# Patient Record
Sex: MALE | Race: WHITE | URBAN - METROPOLITAN AREA
[De-identification: names, ages, dates, MRNs, and addresses within clinical notes are randomized per-mention and may not be internally consistent; named-entity substitution may affect disease eponyms.]

---

## 2021-05-31 ENCOUNTER — OFFICE VISIT - RIVER FALLS (OUTPATIENT)
Dept: FAMILY MEDICINE | Facility: CLINIC | Age: 86
End: 2021-05-31

## 2021-06-01 ENCOUNTER — AMBULATORY - RIVER FALLS (OUTPATIENT)
Dept: FAMILY MEDICINE | Facility: CLINIC | Age: 86
End: 2021-06-01

## 2021-06-01 ENCOUNTER — COMMUNICATION - RIVER FALLS (OUTPATIENT)
Dept: FAMILY MEDICINE | Facility: CLINIC | Age: 86
End: 2021-06-01

## 2021-06-03 ENCOUNTER — AMBULATORY - RIVER FALLS (OUTPATIENT)
Dept: FAMILY MEDICINE | Facility: CLINIC | Age: 86
End: 2021-06-03

## 2021-06-04 ENCOUNTER — COMMUNICATION - RIVER FALLS (OUTPATIENT)
Dept: FAMILY MEDICINE | Facility: CLINIC | Age: 86
End: 2021-06-04

## 2021-06-07 ENCOUNTER — AMBULATORY - RIVER FALLS (OUTPATIENT)
Dept: FAMILY MEDICINE | Facility: CLINIC | Age: 86
End: 2021-06-07

## 2021-06-08 ENCOUNTER — COMMUNICATION - RIVER FALLS (OUTPATIENT)
Dept: FAMILY MEDICINE | Facility: CLINIC | Age: 86
End: 2021-06-08

## 2021-06-14 ENCOUNTER — AMBULATORY - RIVER FALLS (OUTPATIENT)
Dept: FAMILY MEDICINE | Facility: CLINIC | Age: 86
End: 2021-06-14

## 2021-06-14 LAB — INR PPP: 2.1

## 2021-06-15 LAB — INR PPP: 3.4

## 2021-06-16 ENCOUNTER — OFFICE VISIT - RIVER FALLS (OUTPATIENT)
Dept: FAMILY MEDICINE | Facility: CLINIC | Age: 86
End: 2021-06-16

## 2021-06-17 ENCOUNTER — AMBULATORY - RIVER FALLS (OUTPATIENT)
Dept: FAMILY MEDICINE | Facility: CLINIC | Age: 86
End: 2021-06-17

## 2021-06-23 ENCOUNTER — AMBULATORY - RIVER FALLS (OUTPATIENT)
Dept: FAMILY MEDICINE | Facility: CLINIC | Age: 86
End: 2021-06-23

## 2021-06-23 LAB — INR PPP: 3.6

## 2021-06-24 ENCOUNTER — COMMUNICATION - RIVER FALLS (OUTPATIENT)
Dept: FAMILY MEDICINE | Facility: CLINIC | Age: 86
End: 2021-06-24

## 2021-06-25 ENCOUNTER — OFFICE VISIT - RIVER FALLS (OUTPATIENT)
Dept: FAMILY MEDICINE | Facility: CLINIC | Age: 86
End: 2021-06-25

## 2021-07-05 LAB
BUN SERPL-MCNC: 37 MG/DL
CALCIUM SERPL-MCNC: 10.4 MEQ/DL
CHLORIDE BLD-SCNC: 102 MEQ/L
CO2 SERPL-SCNC: 21 MEQ/L
CREAT SERPL-MCNC: 1.59 MG/DL
HCT VFR BLD AUTO: 42.3 %
HGB BLD-MCNC: 14.1 G/DL
INR PPP: 2.7
MCH RBC QN AUTO: 30.5 PG
MCHC RBC AUTO-ENTMCNC: 33.3 GM/DL
MCV RBC AUTO: 91.4 FL
PLATELET # BLD AUTO: 237 X10
POTASSIUM BLD-SCNC: 4.6 MEQ/L
PROTHROMBIN TIME: 26.2 S
RBC # BLD AUTO: 4.63 X10
SODIUM SERPL-SCNC: 138 MEQ/L
WBC # BLD AUTO: 6.2 X10

## 2021-07-07 ENCOUNTER — COMMUNICATION - RIVER FALLS (OUTPATIENT)
Dept: FAMILY MEDICINE | Facility: CLINIC | Age: 86
End: 2021-07-07

## 2022-02-16 NOTE — TELEPHONE ENCOUNTER
---------------------  From: Joselin Manley RN   Sent: 7/21/2021 3:55:02 PM CDT  Subject: INR reminder     Patient is 21 days past due for INR.  First letter has been printed from [employer] and sent to [facility] for scanning and mailing.

## 2022-02-16 NOTE — NURSING NOTE
Anticoagulation Therapy Management Entered On:  6/15/2021 8:12 AM CDT    Performed On:  6/14/2021 8:12 AM CDT by Pam Mujica RN               Anticoagulation Visit Assessment   Type of Visit - Anticoagulation :   Telephone   Anticoagulation Indication :   Atrial flutter   Anticoagulant Duration :   Undetermined   Anticoagulation Medication Verified :   Yes   Pam Mujica RN - 6/15/2021 8:12 AM CDT   Anticoagulation Patient Assessment Grid   Change in Alcohol Consumption :   No   Change in Diet :   No   Change in Medications :   No   Diarrhea :   No   Rectal Bleeding :   No   Signs of Clotting :   No   Signs of Warfarin Intolerance :   No   Unusual Bleeding, Bruising :   No   Upcoming Procedures :   No   Vomiting :   No   Pam Mujica RN - 6/15/2021 8:12 AM CDT   Patient on Warfarin :   Yes   Pam Mujica RN - 6/15/2021 8:12 AM CDT   Warfarin   International Normalization Ratio TR :   3.4    Anticoagulant INR Goal Lower :   2    Anticoagulant INR Goal Upper :   3    Sunday :   2.5 mg   Monday :   5 mg   Tuesday :   2.5 mg   Wednesday :   5 mg   Thursday :   2.5 mg   Friday :   5 mg   Saturday :   2.5 mg   Total Dose :   25 mg   Warfarin Pt Reported Previous Week Dose :    Sun Mon Tues Wed Thurs Fri Sat Weekly Total Dose   Week 1 2.5 mg 5 mg 2.5 mg 5 mg 2.5 mg 5 mg 2.5 mg 25 mg   Week 2  mg  mg  mg  mg  mg  mg  mg  mg   Week 3  mg  mg  mg  mg  mg  mg  mg  mg   Week 4  mg  mg  mg  mg  mg  mg  mg  mg         Sunday :   2.5 mg   Monday :   5 mg   Tuesday :   0 mg   Wednesday :   5 mg   Thursday :   2.5 mg   Friday :   5 mg   Saturday :   2.5 mg   Week 1 Total Dose :   22.5 mg   Warfarin Dosing Acknowledgement :   I have reviewed the patient's warfarin dosing schedule and confirmed its accuracy for today's visit.   Patient Instructions :   INR = 3.4 per [facility] lab. Per protocol, hold Warfarin x1 dose then resume 5mg Mon/Wed/Fri and 2.5mg ROW. Recheck INR 6/17/21. Orders faxed.      Pam Mujica RN - 6/15/2021 8:12 AM CDT

## 2022-02-16 NOTE — PROGRESS NOTES
History of Present Illness       New admit to [facility] facility visit.  16th patient has had significant health issues that include heart failure COPD coronary artery disease obstructive sleep apnea hypertension anemia gout and atrial fibrillation.  He underwent right total hip arthroplasty because of the pain he was having there.  He is in the nursing home now for physical therapy.  Upon admission he had significant amount of fluid retention his Lasix was increased from 20 to 40 mg now has had a significant weight loss and the correct patient probably at his near driveway.       His ejection fracture less than 35% he has not been very active.       He still using Dilaudid for occasional pains about twice a day was written for by orthopedics       His present plan is for return to his home in about 1 week time.  He is making arrangements and helped through .       Last blood test were from May 28 that time he had a creatinine of 1.2 and a potassium of 5.8 his hemoglobin was 11.8       Patient feels like his progress is going well been eating and sleeping well he has no complaints about breathing or activity  Review of Systems       See HPI.  All other review of systems negative.  Physical Exam       Patient is lying in his bed very talkative without any respiratory distress he is laying flat.  He has 2+ edema lower extremities bilaterally equal his abdomen is nondistended       No swelling in his ankles or knees  Assessment/Plan       1. Congestive heart failure (I50.9)          Will be doing a BNP along with a Chem-8 appears he may be at dry weight will decrease his furosemide to 20 mg which would be his most common stable dose over the last several months       2. Chronic obstructive pulmonary disease, unspecified (J44.9)          No signs of respiratory distress no coughing       3. 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 CAD (coronary artery disease) (I25.10)          He has not had any chest pains or pressure he is tolerating his activity level at a nursing home       5. Obstructive sleep apnea (adult) (pediatric) (G47.33)          He should be treating his BARRETT during this time of healing especially  Patient Information     Name:CARLEY LATHAM      Address:      [address] [id number]     Sex:Male     YOB: 1935     [phone]     Emergency Contact:MARGO LOWRY     MRN:[id number]     FIN:[id number]     Location:[facility]     Date of Service:06/16/2021      Primary Care Physician:       Shanique De Anda MD, [phone]      Attending Physician:       Guillaume Smith MD, [phone]  Problem List/Past Medical History    Ongoing     Abdominal aortic ectasia     Anemia, unspecified     Anticoagulated     Benign prostatic hyperplasia without lower urinary tract symptoms     CAD (coronary artery disease)     Chronic obstructive pulmonary disease, unspecified     Congestive heart failure     Essential (primary) hypertension     GERD (gastroesophageal reflux disease)     Gout     Hyperkalemia     Hyperlipidemia, unspecified     Ischemic cardiomyopathy     Obstructive sleep apnea (adult) (pediatric)     Pain in right hip     Radiculopathy, site unspecified     Spinal stenosis, lumbar region with neurogenic claudication     Thrombocytopenia, unspecified     Unspecified atrial fibrillation    Historical     No qualifying data  Procedure/Surgical History     Arthroplasty of the hip (05/24/2021)      Comments: Total right..     Cardiac catheterization (08/24/2020)     Decompression of lumbar spine (12/10/2018)     Pacemaker care (12/01/2016)      Comments: Pack change..     Arthroscopy of knee (12/15/2014)      Comments: Right..     Cardiac pacemaker (08/06/2013)     Hemorrhoidectomy (1999)  Medications    acetaminophen, 1000 mg, Oral, q6 hrs, PRN     allopurinol 100 mg oral tablet, 100 mg= 1 tab(s), Oral, bid    budesonide 0.5 mg/2 mL inhalation suspension, 0.5 mg= 2 mL, NEB, daily    carvedilol 12.5 mg oral tablet, 12.5 mg= 1 tab(s), Oral, daily    Cepacol Sore Throat 15 mg-3.6 mg mucous membrane lozenge, 1 lozenge(s), Oral, q2 hr (int), PRN    cholecalciferol 1000 intl units oral tablet, 1000 International Unit= 1 tab(s), Oral, bid    colchicine 0.6 mg oral tablet, 0.3 mg= 0.5 tab(s), Oral, tid, PRN    Dilaudid 2 mg oral tablet, 0.5 tab, Oral, q3 hr (int), PRN    DuoNeb 0.5 mg-2.5 mg/3 mL inhalation solution, 3 mL, Inhale, qid    enoxaparin 40 mg/0.4 mL injectable solution, 40 mg, Subcutaneous, daily    hydrocortisone 1% topical cream, Topical, qid    lidocaine 5% patch, 1 patch(es), Topical, daily    losartan 50 mg oral tablet, 50 mg= 1 tab(s), Oral, daily    lovastatin 40 mg oral tablet, 40 mg= 1 tab(s), Oral, daily    polyethylene glycol 3350 with electrolytes oral powder for reconstitution, PRN    Senokot S 50 mg-8.6 mg oral tablet, 1 tab(s), Oral, hs, PRN    spironolactone 25 mg oral tablet, 25 mg= 1 tab(s), Oral, daily    Tums, 500 mg, Chewed, daily    Voltaren 1% topical gel, 1 leena, Topical, qid, PRN    warfarin 5 mg oral tablet, 5 mg= 1 tab(s), Oral, daily  Allergies    NSAIDs (Flushing)    Ticlid    ceFAZolin    simvastatin  Social History     Employment/School      Retired, Work/School description: .     Home/Environment      Marital status: . Spouse/Partner name: Gaby Guy. Home equipment: Walker/Cane.  Lab Results          Lab Results (Last 4 results within 90 days)           PT: 15.9 High [9  - 11.5] (06/01/21 00:00:00)          INR: 1.7 High (06/01/21 00:00:00)          INR TR: 3.4 (06/14/21 08:12:00)          INR TR: 2.1 (06/08/21 14:43:00)  Immunizations          Other Immunizations          Administration Dosage Date(s)          tetanus/diphth/pertuss (Tdap) adult/adol          09/09/2020

## 2022-02-16 NOTE — TELEPHONE ENCOUNTER
---------------------  From: Kadi Harmon CMA (Phone Messages Pool ([id number]_[facility]))   To: Ivan Herrera PA-C;     Sent: 6/4/2021 9:03:22 AM CDT  Subject: Phone Message, medications.     Phone Message    PCP:   KAH      Time of Call:  0854       Person Calling:  Zuleika with [facility].   Phone number:  [phone]    Returned call at: Direct call.    Note:   MARY ANNE had given orders to hold BP medications per Zuleika until today. She wonders, if they should restart the BP medications now? She requests a call from KAH to discuss medications. Please advise.    Last office visit and reason:  05-31-21 routine nursing home rounds CHT---------------------  From: Ivan Herrera PA-C   To: Phone Messages [employer] ([id number]_[place]);     Sent: 6/4/2021 9:09:29 AM CDT  Subject: RE: Phone Message, medications.     I did call her. Weight up about 5 days. Resume 40 mg po daily of Lasix. Hold on rest of medications    KAHNoted

## 2022-02-16 NOTE — TELEPHONE ENCOUNTER
---------------------  From: Pam Mujica RN   Sent: 6/1/2021 4:29:48 PM CDT  Subject: INR Results     Spoke with Libia at [facility] and informed her that no INR results are back yet. Advised they continue 2.5mg Warfarin tonight and we will fax updated orders in the morning. She expressed understanding.

## 2022-02-16 NOTE — TELEPHONE ENCOUNTER
---------------------  From: Pam Mujica RN   Sent: 6/7/2021 4:42:21 PM CDT  Subject: INR     Spoke with Zuleika at [facility] - informed her no INR results yet for patient INR drawn today. Advised they continue with same dose tonight - orders will be faxed back tomorrow morning. She expressed understanding.

## 2022-02-16 NOTE — TELEPHONE ENCOUNTER
---------------------  From: Pam Mujica RN (INR Pool ( [id number]_[facility]))   To: Shanique De Anda MD;     Sent: 6/8/2021 8:00:22 AM CDT  Subject: Lovenox     Pt currently taking Warfarin 5mg Mon/Wed/Fri and 2.5mg ROW - along with Lovenox 40mg daily.    6/7/21 INR = 2.1    OK to discontinue Lovenox?---------------------  From: Pam Mujica RN (INR Pool ( [id number]_[facility]))   To: Ivan Herrera PA-C;     Sent: 6/8/2021 2:03:55 PM CDT  Subject: FW: Lovenox     OK for patient to discontinue Lovenox?  (Forgot KWL not in clinic today)---------------------  From: Ivan Herrera PA-C   To: INR Pool ( [id number]_[facility]);     Sent: 6/8/2021 2:18:22 PM CDT  Subject: RE: Lovenox     Yes, OK to discontinue the Lovenox    KAHCalled and spoke with Zuleika - advised she discontinue Lovenox per KAH. She expressed understanding.

## 2022-02-16 NOTE — NURSING NOTE
Anticoagulation Therapy Management Entered On:  6/23/2021 5:03 PM CDT    Performed On:  6/23/2021 4:59 PM CDT by Joselin Manley RN               Anticoagulation Visit Assessment   Anticoagulation Indication :   Atrial flutter   Anticoagulant Duration :   Undetermined   Anticoagulation Medication Verified :   Yes   Joselin Manley RN - 6/23/2021 4:59 PM CDT   Anticoagulation Patient Assessment Grid   Change in Alcohol Consumption :   No   Change in Diet :   No   Change in Medications :   No   Diarrhea :   No   Rectal Bleeding :   No   Signs of Clotting :   No   Signs of Warfarin Intolerance :   No   Unusual Bleeding, Bruising :   No   Upcoming Procedures :   No   Vomiting :   No   Joselin Manley RN - 6/23/2021 4:59 PM CDT   Patient on Warfarin :   Yes   Joselin Manley RN - 6/23/2021 4:59 PM CDT   Warfarin   International Normalization Ratio TR :   3.6    Anticoagulant INR Goal Lower :   2    Anticoagulant INR Goal Upper :   3    Sunday :   2.5 mg   Monday :   5 mg   Tuesday :   2.5 mg   Wednesday :   0 mg   Thursday :   2.5 mg   Friday :   2.5 mg   Saturday :   2.5 mg   Total Dose :   17.5 mg   Warfarin Pt Reported Previous Week Dose :    Sun Mon Tues Wed Thurs Fri Sat Weekly Total Dose   Week 1 2.5 mg 5 mg 0 mg 5 mg 2.5 mg 5 mg 2.5 mg 22.5 mg   Week 2  mg  mg  mg  mg  mg  mg  mg  mg   Week 3  mg  mg  mg  mg  mg  mg  mg  mg   Week 4  mg  mg  mg  mg  mg  mg  mg  mg         Sunday :   2.5 mg   Monday :   5 mg   Tuesday :   0 mg   Wednesday :   5 mg   Thursday :   2.5 mg   Friday :   5 mg   Saturday :   2.5 mg   Week 1 Total Dose :   22.5 mg   Warfarin Dosing Acknowledgement :   I have reviewed the patient's warfarin dosing schedule and confirmed its accuracy for today's visit.   Patient Instructions :   [facility] INR = 3.6; per protocol hold warfarin x1 day then resume at 5 mg Monday and 2.5 mg ROW. Recheck INR in 1 week. Faxed and called directions to [facility].     Joselin Manley RN - 6/23/2021 4:59 PM CDT

## 2022-02-16 NOTE — NURSING NOTE
Anticoagulation Therapy Management Entered On:  6/18/2021 11:03 AM CDT    Performed On:  6/18/2021 10:59 AM CDT by Felicita Thomas RN               Anticoagulation Visit Assessment   Anticoagulation Indication :   Atrial flutter   Anticoagulant Duration :   Undetermined   Anticoagulation Medication Verified :   Yes   Felicita Thomas RN - 6/18/2021 10:59 AM CDT   Anticoagulation Patient Assessment Grid   Change in Alcohol Consumption :   No   Change in Diet :   No   Change in Medications :   No   Diarrhea :   No   Rectal Bleeding :   No   Signs of Clotting :   No   Signs of Warfarin Intolerance :   No   Unusual Bleeding, Bruising :   No   Upcoming Procedures :   No   Vomiting :   No   Felicita Thomas RN - 6/18/2021 10:59 AM CDT   Patient on Warfarin :   Yes   Felicita Thomas RN - 6/18/2021 10:59 AM CDT   Warfarin   Anticoagulant INR Goal Lower :   2    Anticoagulant INR Goal Upper :   3    Sunday :   2.5 mg   Monday :   5 mg   Tuesday :   2.5 mg   Wednesday :   5 mg   Thursday :   2.5 mg   Friday :   5 mg   Saturday :   2.5 mg   Total Dose :   25 mg   Warfarin Pt Reported Previous Week Dose :    Sun Mon Tues Wed Thurs Fri Sat Weekly Total Dose   Week 1 2.5 mg 5 mg 0 mg 5 mg 2.5 mg 5 mg 2.5 mg 22.5 mg   Week 2  mg  mg  mg  mg  mg  mg  mg  mg   Week 3  mg  mg  mg  mg  mg  mg  mg  mg   Week 4  mg  mg  mg  mg  mg  mg  mg  mg         Sunday :   2.5 mg   Monday :   5 mg   Tuesday :   0 mg   Wednesday :   5 mg   Thursday :   2.5 mg   Friday :   5 mg   Saturday :   2.5 mg   Week 1 Total Dose :   22.5 mg   Warfarin Dosing Acknowledgement :   I have reviewed the patient's warfarin dosing schedule and confirmed its accuracy for today's visit.   Patient Instructions :   INR = 2.7 per [facility] draw on 6/17/21 Patient is to continue 5mg warfarin on Mon, Wed, and Fri and 2.5mg the rest of the days of the week Recheck INR on 6/23/21. Directions faxed to [facility]. Patient plans to be discharged to home on 6/25/21     William GOODSON  Felicita - 6/18/2021 10:59 AM CDT

## 2022-02-16 NOTE — TELEPHONE ENCOUNTER
---------------------  From: Pam Mujica RN (Phone Messages Pool ([id number]_[facility]))   To: INR Pool ( [id number]_[facility]);     Sent: 6/24/2021 10:09:51 AM CDT  Subject: KELSIE Reza at [facility] patient is discharging home tomorrow.noted   INR due  6/30/21   28-Aug-2018

## 2022-02-16 NOTE — NURSING NOTE
Anticoagulation Therapy Management Entered On:  6/14/2021 2:44 PM CDT    Performed On:  6/8/2021 2:43 PM CDT by Pam Mujica RN               Anticoagulation Visit Assessment   Type of Visit - Anticoagulation :   Telephone   Anticoagulation Indication :   Atrial flutter   Anticoagulant Duration :   Undetermined   Anticoagulation Medication Verified :   Yes   Pam Mujica RN - 6/14/2021 2:43 PM CDT   Anticoagulation Patient Assessment Grid   Change in Alcohol Consumption :   No   Change in Diet :   No   Change in Medications :   No   Diarrhea :   No   Rectal Bleeding :   No   Signs of Clotting :   No   Signs of Warfarin Intolerance :   No   Unusual Bleeding, Bruising :   No   Upcoming Procedures :   No   Vomiting :   No   Pam Mujica RN - 6/14/2021 2:43 PM CDT   Patient on Warfarin :   Yes   Pam Mujica RN - 6/14/2021 2:43 PM CDT   Warfarin   International Normalization Ratio TR :   2.1    Anticoagulant INR Goal Lower :   2    Anticoagulant INR Goal Upper :   3    Sunday :   2.5 mg   Monday :   5 mg   Tuesday :   2.5 mg   Wednesday :   5 mg   Thursday :   2.5 mg   Friday :   5 mg   Saturday :   2.5 mg   Total Dose :   25 mg   Warfarin Pt Reported Previous Week Dose :    Sun Mon Tues Wed Thurs Fri Sat Weekly Total Dose   Week 1 2.5 mg 5 mg 2.5 mg 5 mg 2.5 mg 5 mg 2.5 mg 25 mg   Week 2  mg  mg  mg  mg  mg  mg  mg  mg   Week 3  mg  mg  mg  mg  mg  mg  mg  mg   Week 4  mg  mg  mg  mg  mg  mg  mg  mg         Sunday :   2.5 mg   Monday :   5 mg   Tuesday :   2.5 mg   Wednesday :   5 mg   Thursday :   2.5 mg   Friday :   5 mg   Saturday :   2.5 mg   Week 1 Total Dose :   25 mg   Warfarin Dosing Acknowledgement :   I have reviewed the patient's warfarin dosing schedule and confirmed its accuracy for today's visit.   Patient Instructions :   INR = 2.1 per [facility] lab. Per protocol, continue Warfarin 5mg Mon/Wed/Fri and 2.5mg ROW. Recheck INR 6/14/21. Orders faxed.      Pam Mujica RN - 6/14/2021 2:43 PM CDT

## 2022-02-16 NOTE — NURSING NOTE
Anticoagulation Therapy Management Entered On:  6/4/2021 9:44 AM CDT    Performed On:  6/4/2021 9:39 AM CDT by Felicita Thomas RN               Anticoagulation Visit Assessment   Anticoagulation Indication :   Atrial flutter   Anticoagulant Duration :   Undetermined   Anticoagulation Medication Verified :   Yes   Felicita Thomas RN - 6/4/2021 9:39 AM CDT   Anticoagulation Patient Assessment Grid   Change in Alcohol Consumption :   No   Change in Diet :   No   Change in Medications :   Yes   (Comment: Still on lovenox [Felicita Thomas RN - 6/4/2021 9:39 AM CDT] )   Diarrhea :   No   Rectal Bleeding :   No   Signs of Clotting :   No   Signs of Warfarin Intolerance :   No   Unusual Bleeding, Bruising :   No   Upcoming Procedures :   No   Vomiting :   No   Felicita Thomas RN - 6/4/2021 9:39 AM CDT   Patient on Warfarin :   Yes   Felicita Thomas RN - 6/4/2021 9:39 AM CDT   Warfarin   Anticoagulant INR Goal Lower :   2    Anticoagulant INR Goal Upper :   3    Information Given by :   Other: [facility]   Sunday :   5 mg   Monday :   2.5 mg   Tuesday :   2.5 mg   Wednesday :   5 mg   Thursday :   2.5 mg   Total Dose :   17.5 mg   Warfarin Pt Reported Previous Week Dose :    Sun Mon Tues Wed Thurs Fri Sat Weekly Total Dose   Week 1 5 mg 2.5 mg 2.5 mg 5 mg 0 mg 0 mg 0 mg 15 mg   Week 2  mg  mg  mg  mg  mg  mg  mg  mg   Week 3  mg  mg  mg  mg  mg  mg  mg  mg   Week 4  mg  mg  mg  mg  mg  mg  mg  mg         Sunday :   2.5 mg   Monday :   5 mg   Tuesday :   2.5 mg   Wednesday :   5 mg   Thursday :   2.5 mg   Friday :   5 mg   Saturday :   2.5 mg   Week 1 Total Dose :   25 mg   Warfarin Dosing Acknowledgement :   I have reviewed the patient's warfarin dosing schedule and confirmed its accuracy for today's visit.   Patient Instructions :   INR = 1.8 per [facility] draw on 6/3/21 Patient is to start 5mg warfarin on Mon , Wed, Fri and 2.5mg the rest of the days of the week Recheck INR on 6/7/21 Continue Lovenox. Directions faxed to  [facility].     William GOODSON, Felicita - 6/4/2021 9:39 AM CDT

## 2022-02-16 NOTE — TELEPHONE ENCOUNTER
---------------------  From: Joselin Manley RN   Sent: 8/4/2021 11:50:43 AM CDT  Subject: Warfarin dosing by DAYNA Lenz     Called pt as he is 35 days past due for INR  He is having INRs done and warfarin dosed by DAYNA Lenz  [facility]

## 2022-02-16 NOTE — TELEPHONE ENCOUNTER
---------------------  From: Felicita Thomas RN   To: INR Pool ( [id number]_[facility]);     Sent: 6/3/2021 4:47:44 PM CDT  Subject: INR     Call to [facility] Advised nurse we do not have the INR back from today yet. Patient to take 2.5mg warfarin today 6/3/21 and hold Lovenox until noon on 6/4/21 as he may not need further Lovenox.  Will contact them with results on 6/4/21. [facility] Nurse advised.faxed directions

## 2022-02-16 NOTE — NURSING NOTE
Anticoagulation Therapy Management Entered On:  6/2/2021 11:49 AM CDT    Performed On:  6/2/2021 11:38 AM CDT by Felicita Thomas RN               Anticoagulation Visit Assessment   Anticoagulation Indication :   Atrial flutter   Anticoagulant Duration :   Undetermined   Anticoagulation Medication Verified :   Yes   Felicita Thomas RN - 6/2/2021 11:38 AM CDT   Anticoagulation Patient Assessment Grid   Change in Alcohol Consumption :   No   Change in Diet :   No   Change in Medications :   Yes   (Comment: Enoxaparin 40 mg daily  status post hip replacement [Felicita Thomas RN - 6/2/2021 11:38 AM CDT] )   Diarrhea :   No   Rectal Bleeding :   No   Signs of Clotting :   No   Signs of Warfarin Intolerance :   No   Unusual Bleeding, Bruising :   No   Upcoming Procedures :   No   Vomiting :   No   Felicita Thomas RN - 6/2/2021 11:38 AM CDT   Patient on Warfarin :   Yes   Patient on Other Anticoagulant :   No   Felicita Thomas RN - 6/2/2021 11:38 AM CDT   Warfarin   Anticoagulant INR Goal Lower :   2    Anticoagulant INR Goal Upper :   3    Information Given by :   Other: [facility]   Bridge Therapy Indication :   Major orthopedic surgery   Sunday :   5 mg   Monday :   2.5 mg   Tuesday :   2.5 mg   Wednesday :   0 mg   Thursday :   0 mg   Friday :   0 mg   Saturday :   0 mg   Total Dose :   10 mg   Warfarin Pt Reported Previous Week Dose :    Sun Mon Tues Wed Thurs Fri Sat Weekly Total Dose   Week 1                   Week 2                   Week 3                   Week 4                         Sunday :   5 mg   Monday :   2.5 mg   Tuesday :   2.5 mg   Wednesday :   5 mg   Thursday :   0 mg   Friday :   0 mg   Saturday :   0 mg   Week 1 Total Dose :   15 mg   Warfarin Dosing Acknowledgement :   I have reviewed the patient's warfarin dosing schedule and confirmed its accuracy for today's visit.   Patient Instructions :   INR = 1.7 per [facility] drawn on 6/1/21 Patient is to take 5mg warfarin today and recheck INR on 6/3/21.  Directions faxed to [facility]. Will continue Enoxaparin today. Directions faxed to [facility].     Felicita Thomas RN - 6/2/2021 11:38 AM CDT   Bridge Therapy   Bridging Medication :   enoxaparin   Bridging Therapy Comments :   40mg daily    Felicita Thomas RN - 6/2/2021 11:38 AM CDT

## 2022-02-16 NOTE — TELEPHONE ENCOUNTER
---------------------  From: Pam Mujica RN (Phone Messages Pool ([id number]_[facility]))   To: [facility] Message Pool ([id number]_[place]);     Sent: 6/24/2021 10:01:46 AM CDT  Subject: Phone Message     Phone Message    PCP:   KWL - saw ZIM during rounds      Time of Call:  0959       Person Calling:  [facility]  Phone number:  [phone]    Returned call at: _    Note:   Received direct call from Libia. She states patient is preparing for discharge home tomorrow - discharge orders signed by ANH yesterday. His pharmacy is requesting individual prescriptions be sent to them vs a medication list. Libia states she will fax over current med list to [facility] team if he will send prescriptions to [facility] in [place].---------------------  From: Sahara Garzon CMA ([facility] Message Pool ([id number]_[facility]))   To: Guillaume Smith MD;     Sent: 6/24/2021 10:10:40 AM CDT  Subject: FW: Phone Message---------------------  From: Guillaume Smith MD   To: [facility] Message Pool ([id number]_[place]);     Sent: 6/24/2021 11:41:16 AM CDT  Subject: RE: Phone Message     ok to send medsI sent these in to [facility] in [place] per notes from the fax.

## 2022-02-16 NOTE — TELEPHONE ENCOUNTER
---------------------  From: Heather Burkett CMA (Phone Messages Pool ([id number]_[employer]))   To: Guillaume Smith MD;     Sent: 7/12/2021 2:39:40 PM CDT  Subject: phone note- Low BP     Pedro Maher  # [phone]    Note: Calling with Low BP, 82/49  P83 and 02%95. It is their protocol to update provider with low BP. If you would like them to do anything call [place].---------------------  From: Guillaume Smith MD   To: Phone Messages Pool ([id number]_[place] [facility] [place]);     Sent: 7/12/2021 2:44:00 PM CDT  Subject: RE: phone note- Low BP     i would hold losartan until blood pressure greater than 140 systolic.Notified patient at 1456 to hold Losartan. Call with an update in one month with BP readings. Sees Dr Rosario cardiologist 07-26-21 per patient.---------------------  From: Kadi Harmon CMA (Phone Messages Pool ([id number]_[employer]))   To: Guillaume Smith MD;     Sent: 7/12/2021 2:57:23 PM CDT  Subject: FW: phone note- Low BP

## 2022-02-16 NOTE — PROGRESS NOTES
History of Present Illness      Patient is ready to go home. Physical therapy thinks he will do well and he does too. He is up walking in the [place] with his walker and getting along well.  Review of Systems      Otherwise not contributory  Physical Exam      VSS afebrile NAD      He has minimal hip pain and his ambulating with Walker without difficulty.  Assessment/Plan       History of total hip arthroplasty (Z96.649)         Will discharge home with home PT and his usual orders. He will follow up with his PA from [facility] in [place].  Patient Information     Name:CARLEY LATHAM      Address:       [address] [id number]     Sex:Male     YOB: 1935     [phone]     Emergency Contact:MARGO LOWRY     MRN:[id number]     FIN:[id number]     Location:[facility]     Date of Service:06/25/2021      Primary Care Physician:       Neetu ROU[facility], [facility], [phone]      Attending Physician:       Merle ROUSE, [place], [phone]  Problem List/Past Medical History    Ongoing     Abdominal aortic ectasia     Anemia, unspecified     Anticoagulated     Benign prostatic hyperplasia without lower urinary tract symptoms     CAD (coronary artery disease)     Chronic obstructive pulmonary disease, unspecified     Congestive heart failure     Essential (primary) hypertension     GERD (gastroesophageal reflux disease)     Gout     Hyperkalemia     Hyperlipidemia, unspecified     Ischemic cardiomyopathy     Obstructive sleep apnea (adult) (pediatric)     Pain in right hip     Radiculopathy, site unspecified     Spinal stenosis, lumbar region with neurogenic claudication     Thrombocytopenia, unspecified     Unspecified atrial fibrillation    Historical     No qualifying data  Procedure/Surgical History     Arthroplasty of the hip (05/24/2021)      Comments: Total right..     Cardiac catheterization (08/24/2020)     Decompression of lumbar spine  (12/10/2018)     Pacemaker care (12/01/2016)      Comments: Pack change..     Arthroscopy of knee (12/15/2014)      Comments: Right..     Cardiac pacemaker (08/06/2013)     Hemorrhoidectomy (1999)  Medications    acetaminophen, 1000 mg, Oral, q6 hrs, PRN    allopurinol 100 mg oral tablet, 100 mg= 1 tab(s), Oral, bid, 2 refills    budesonide 0.5 mg/2 mL inhalation suspension, 0.5 mg= 2 mL, NEB, daily, 2 refills    carvedilol 12.5 mg oral tablet, 12.5 mg= 1 tab(s), Oral, daily    Cepacol Sore Throat 15 mg-3.6 mg mucous membrane lozenge, 1 lozenge(s), Oral, q2 hr (int), PRN    cholecalciferol 1000 intl units oral tablet, 1000 International Unit= 1 tab(s), Oral, bid, 2 refills    colchicine 0.6 mg oral tablet, 0.3 mg= 0.5 tab(s), Oral, tid, PRN    Coumadin 2.5 mg oral tablet, See Instructions    Dilaudid 2 mg oral tablet, 0.5 tab, Oral, q3 hr (int), PRN    DuoNeb 0.5 mg-2.5 mg/3 mL inhalation solution, 3 mL, Inhale, qid, 2 refills    enoxaparin 40 mg/0.4 mL injectable solution, 40 mg, Subcutaneous, daily    furosemide 20 mg oral tablet, 20 mg= 1 tab(s), Oral, daily, 3 refills    hydrocortisone 1% topical cream, Topical, qid    lidocaine 5% topical film, 1 patch(es), Topical, daily, 3 refills    losartan 50 mg oral tablet, 50 mg= 1 tab(s), Oral, daily, 2 refills    lovastatin 40 mg oral tablet, 40 mg= 1 tab(s), Oral, daily    polyethylene glycol 3350 with electrolytes oral powder for reconstitution, PRN    Senokot S 50 mg-8.6 mg oral tablet, 1 tab(s), Oral, hs, PRN    spironolactone 25 mg oral tablet, 25 mg= 1 tab(s), Oral, daily, 2 refills    Tums, 500 mg, Chewed, daily    Voltaren 1% topical gel, 1 leena, Topical, qid, PRN    warfarin 5 mg oral tablet, See Instructions  Allergies    NSAIDs (Flushing)    Ticlid    ceFAZolin    simvastatin  Social History     Employment/School      Retired, Work/School description: .     Home/Environment      Marital status: . Spouse/Partner name: Gaby Guy. Home  equipment: Walker/Cane.  Lab Results          Lab Results (Last 4 results within 90 days)           PT: 33.5 High [9  - 11.5] (06/23/21 08:45:00)          PT: 15.9 High [9  - 11.5] (06/01/21 00:00:00)          INR: 3.6 High (06/23/21 08:45:00)          INR: 1.7 High (06/01/21 00:00:00)          INR TR: 3.6 (06/23/21 16:59:00)          INR TR: 3.4 (06/14/21 08:12:00)          INR TR: 2.1 (06/08/21 14:43:00)  Immunizations          Other Immunizations          Administration Dosage Date(s)          tetanus/diphth/pertuss (Tdap) adult/adol          09/09/2020

## 2022-02-16 NOTE — TELEPHONE ENCOUNTER
---------------------  From: Vineet RN, Joselin ALVARADO   To: INR Pool ( [id number]_[facility]);     Cc: Phone Messages Pool ([id number]_[place]);      Sent: 6/23/2021 3:45:53 PM CDT  Subject: NH call     Nurse looking for results on pt INR from today  No results available yet    If no results or warfarin directions by 5pm, call [phone] to inform staff to continue same warfarin dose today and await further instructions tomorrow.Call to [facility] at [phone]  We have no INR result from today  I informed nurse, Mana.   Per Mana, she said they just got a fax stating pt INR is 3.6.    See anticoag charting

## 2022-02-16 NOTE — TELEPHONE ENCOUNTER
---------------------  From: Pam Mujica RN   Sent: 6/14/2021 4:21:05 PM CDT  Subject: INR     Received call from Libia at [facility] requesting results for patient's INR. Informed her no results - gave instructions to continued 5mg Warfarin tonight and will fax back new orders tomorrow morning when INR results have been received.  She expressed understanding.

## 2022-02-16 NOTE — PROGRESS NOTES
History of Present Illness      Patient is transferred from [facility] after total hip arthroplasty. He has done well but yesterday had an episode of hypotension. His blood pressure meds were held and his lasix decreased and he has done well since. He knows that his hip is sore but does not want to take oxycodone because he does not like the way it makes him feel. He is here for physical therapy and rehab.  Review of Systems      Review systems is otherwise negative. He's accompanied by family today.  Physical Exam      VSS afebrile NAD. Blood pressure normal.      Lungs clear      Hip wound this bandage but not showing any unusual sign.  Assessment/Plan      Impression: status post total hip arthroplasty now here for rehab.      History of hypotension.      Plan: will continue to monitor blood pressure and adjust medications as needed. He will also continue with physical therapy till able to return home.  Patient Information     Name:CARLEY LATHAM      Address:       [address] [id number]     Sex:Male     YOB: 1935     [phone]     Emergency Contact:MARGO LOWRY     MRN:[id number]     FIN:[id number]     Location:[facility]     Date of Service:06/01/2021      Primary Care Physician:       Shanique De Anda MD, [phone]      Attending Physician:       LAB ,   Problem List/Past Medical History    Ongoing     Abdominal aortic ectasia     Anemia, unspecified     Benign prostatic hyperplasia without lower urinary tract symptoms     CAD (coronary artery disease)     Chronic obstructive pulmonary disease, unspecified     Congestive heart failure     Essential (primary) hypertension     GERD (gastroesophageal reflux disease)     Gout     Hyperkalemia     Hyperlipidemia, unspecified     Ischemic cardiomyopathy     Obstructive sleep apnea (adult) (pediatric)     Pain in right hip     Radiculopathy, site unspecified     Spinal stenosis, lumbar region with  neurogenic claudication     Thrombocytopenia, unspecified     Unspecified atrial fibrillation    Historical     No qualifying data  Procedure/Surgical History     Arthroplasty of the hip (05/24/2021)      Comments: Total right..     Cardiac catheterization (08/24/2020)     Decompression of lumbar spine (12/10/2018)     Pacemaker care (12/01/2016)      Comments: Pack change..     Arthroscopy of knee (12/15/2014)      Comments: Right..     Cardiac pacemaker (08/06/2013)     Hemorrhoidectomy (1999)  Medications   No active medications  Allergies    NSAIDs (Flushing)    Ticlid    ceFAZolin    simvastatin  Social History     Employment/School      Retired, Work/School description: .     Home/Environment      Marital status: . Spouse/Partner name: Gaby Guy. Home equipment: Walker/Cane.  Immunizations          Other Immunizations          Administration Dosage Date(s)          tetanus/diphth/pertuss (Tdap) adult/adol          09/09/2020

## 2022-02-16 NOTE — TELEPHONE ENCOUNTER
---------------------  From: Donovan Abraham LPN   To: [employer] Message Pool ([id number]);     Sent: 7/7/2021 9:16:48 AM CDT  Subject: General Message     Phone message    PCP:   Georges SAUCEDA      Time of Call:  _       Person Calling:  _ Izzy Hernandez, Home Health Nurse  Phone number:  _ [phone]    Returned call at: _    Note:   _ Need verbal order for Speech Therapy to eval and treat dysphagia concerns.  Nurse to fax order, please sign & return.    Last office visit and reason:  _---------------------  From: Rena Echols CMA ([employer] Message Pool ([id number]_[facility]))   To: Shanique De Anda MD;     Sent: 7/7/2021 9:22:19 AM CDT  Subject: FW: General MessageOK---------------------  From: Shanique De Anda MD   To: [employer] Message Pool ([id number]);     Sent: 7/7/2021 9:28:01 AM CDT  Subject: RE: General Messagecontacted Izzy and verbal order given